# Patient Record
Sex: MALE | Race: BLACK OR AFRICAN AMERICAN | NOT HISPANIC OR LATINO | Employment: UNEMPLOYED | URBAN - METROPOLITAN AREA
[De-identification: names, ages, dates, MRNs, and addresses within clinical notes are randomized per-mention and may not be internally consistent; named-entity substitution may affect disease eponyms.]

---

## 2020-12-27 ENCOUNTER — HOSPITAL ENCOUNTER (EMERGENCY)
Facility: HOSPITAL | Age: 25
Discharge: HOME | End: 2020-12-27
Attending: EMERGENCY MEDICINE
Payer: MEDICAID

## 2020-12-27 VITALS
SYSTOLIC BLOOD PRESSURE: 129 MMHG | RESPIRATION RATE: 18 BRPM | OXYGEN SATURATION: 97 % | DIASTOLIC BLOOD PRESSURE: 83 MMHG | HEART RATE: 84 BPM | WEIGHT: 131.31 LBS | TEMPERATURE: 98 F

## 2020-12-27 DIAGNOSIS — K02.9 DENTAL CARIES: Primary | ICD-10-CM

## 2020-12-27 DIAGNOSIS — R22.0 FACIAL SWELLING: ICD-10-CM

## 2020-12-27 PROCEDURE — 99284 EMERGENCY DEPT VISIT MOD MDM: CPT

## 2020-12-27 RX ORDER — PENICILLIN V POTASSIUM 500 MG/1
500 TABLET, FILM COATED ORAL 4 TIMES DAILY
Qty: 40 TABLET | Refills: 0 | Status: SHIPPED | OUTPATIENT
Start: 2020-12-27 | End: 2021-01-06

## 2020-12-27 RX ORDER — TRAMADOL HYDROCHLORIDE 50 MG/1
50 TABLET ORAL EVERY 6 HOURS PRN
Qty: 12 TABLET | Refills: 0 | Status: SHIPPED | OUTPATIENT
Start: 2020-12-27

## 2020-12-27 NOTE — Clinical Note
"Ted Guzmanleah Espinosa was seen and treated in our emergency department on 12/27/2020.  He may return to work on 12/30/2020.       If you have any questions or concerns, please don't hesitate to call.      JAUN Jules"

## 2020-12-27 NOTE — ED PROVIDER NOTES
Encounter Date: 12/27/2020       History     Chief Complaint   Patient presents with    Facial Pain     pt c/o area of swelling and pain to R cheek, x2 days     The history is provided by the patient.   Dental Pain  The primary symptoms include mouth pain. Primary symptoms do not include fever, shortness of breath, sore throat or cough. The symptoms began two days ago. The symptoms are unchanged. The symptoms are new.   Affected locations include: gum(s).   Additional symptoms include: dental sensitivity to temperature, gum swelling, gum tenderness and facial swelling. Additional symptoms do not include: purulent gums, trismus and jaw pain.     Review of patient's allergies indicates:  No Known Allergies  No past medical history on file.  No past surgical history on file.  No family history on file.  Social History     Tobacco Use    Smoking status: Not on file   Substance Use Topics    Alcohol use: Not on file    Drug use: Not on file     Review of Systems   Constitutional: Negative for diaphoresis and fever.   HENT: Positive for dental problem and facial swelling. Negative for sore throat.    Eyes: Negative for photophobia and redness.   Respiratory: Negative for cough and shortness of breath.    Cardiovascular: Negative for chest pain.   Gastrointestinal: Negative for abdominal pain, constipation, diarrhea, nausea and vomiting.   Endocrine: Negative for polydipsia and polyphagia.   Genitourinary: Negative for dysuria and frequency.   Musculoskeletal: Negative for back pain.   Skin: Negative for rash.   Neurological: Negative for weakness.   Hematological: Does not bruise/bleed easily.   Psychiatric/Behavioral: The patient is not nervous/anxious.    All other systems reviewed and are negative.      Physical Exam     Initial Vitals [12/27/20 0849]   BP Pulse Resp Temp SpO2   129/83 84 18 98.1 °F (36.7 °C) 97 %      MAP       --         Physical Exam    Nursing note and vitals reviewed.  Constitutional: He  appears well-developed and well-nourished.   HENT:   Head: Normocephalic and atraumatic.   Right Ear: External ear normal.   Left Ear: External ear normal.   Nose: Nose normal.   Mouth/Throat: Oropharynx is clear and moist. No oral lesions. No trismus in the jaw. Dental caries present. No dental abscesses, uvula swelling or lacerations. No oropharyngeal exudate.   Eyes: Conjunctivae and EOM are normal. Pupils are equal, round, and reactive to light.   Neck: Normal range of motion. Neck supple.   Cardiovascular: Normal rate, regular rhythm, normal heart sounds and intact distal pulses.   Pulmonary/Chest: Breath sounds normal. No respiratory distress. He has no wheezes. He has no rhonchi. He has no rales.   Abdominal: Soft. Bowel sounds are normal. He exhibits no distension. There is no abdominal tenderness. There is no rebound and no guarding.   Musculoskeletal: Normal range of motion.   Neurological: He is alert and oriented to person, place, and time. He has normal strength.   Skin: Skin is warm and dry.   Psychiatric: He has a normal mood and affect. His behavior is normal. Judgment and thought content normal.         ED Course   Procedures  Labs Reviewed - No data to display       Imaging Results    None                                      Clinical Impression:       ICD-10-CM ICD-9-CM   1. Dental caries  K02.9 521.00   2. Facial swelling  R22.0 784.2                      Disposition:   Disposition: Discharged  Condition: Stable     ED Disposition Condition    Discharge Stable        ED Prescriptions     Medication Sig Dispense Start Date End Date Auth. Provider    penicillin v potassium (VEETID) 500 MG tablet Take 1 tablet (500 mg total) by mouth 4 (four) times daily. for 10 days 40 tablet 12/27/2020 1/6/2021 JAUN Jules    traMADoL (ULTRAM) 50 mg tablet Take 1 tablet (50 mg total) by mouth every 6 (six) hours as needed. 12 tablet 12/27/2020  JAUN Jules        Follow-up Information      Follow up With Specialties Details Why Contact Info    Dentist  Go in 1 day                                         JAUN Jules  12/27/20 6095

## 2021-07-19 ENCOUNTER — HOSPITAL ENCOUNTER (EMERGENCY)
Facility: HOSPITAL | Age: 26
Discharge: HOME | End: 2021-07-19
Attending: EMERGENCY MEDICINE
Payer: MEDICAID

## 2021-07-19 VITALS
SYSTOLIC BLOOD PRESSURE: 112 MMHG | DIASTOLIC BLOOD PRESSURE: 58 MMHG | TEMPERATURE: 98 F | OXYGEN SATURATION: 98 % | WEIGHT: 140 LBS | HEART RATE: 64 BPM | RESPIRATION RATE: 18 BRPM | BODY MASS INDEX: 21.22 KG/M2 | HEIGHT: 68 IN

## 2021-07-19 DIAGNOSIS — U07.1 CLINICAL DIAGNOSIS OF COVID-19: Primary | ICD-10-CM

## 2021-07-19 LAB
CTP QC/QA: YES
SARS-COV-2 RDRP RESP QL NAA+PROBE: NEGATIVE

## 2021-07-19 PROCEDURE — U0002 COVID-19 LAB TEST NON-CDC: HCPCS | Mod: ER | Performed by: NURSE PRACTITIONER

## 2021-07-19 PROCEDURE — 99282 EMERGENCY DEPT VISIT SF MDM: CPT | Mod: 25,ER

## 2023-08-29 ENCOUNTER — HOSPITAL ENCOUNTER (EMERGENCY)
Facility: HOSPITAL | Age: 28
Discharge: HOME | End: 2023-08-29
Attending: EMERGENCY MEDICINE
Payer: MEDICAID

## 2023-08-29 VITALS
TEMPERATURE: 99 F | BODY MASS INDEX: 22.05 KG/M2 | RESPIRATION RATE: 16 BRPM | DIASTOLIC BLOOD PRESSURE: 62 MMHG | OXYGEN SATURATION: 100 % | HEART RATE: 84 BPM | WEIGHT: 145 LBS | SYSTOLIC BLOOD PRESSURE: 107 MMHG

## 2023-08-29 DIAGNOSIS — M62.830 SPASM OF LUMBAR PARASPINOUS MUSCLE: Primary | ICD-10-CM

## 2023-08-29 PROCEDURE — 63600175 PHARM REV CODE 636 W HCPCS: Mod: ER

## 2023-08-29 PROCEDURE — 99284 EMERGENCY DEPT VISIT MOD MDM: CPT | Mod: ER

## 2023-08-29 PROCEDURE — 25000003 PHARM REV CODE 250: Mod: ER

## 2023-08-29 PROCEDURE — 96372 THER/PROPH/DIAG INJ SC/IM: CPT

## 2023-08-29 RX ORDER — ORPHENADRINE CITRATE 100 MG/1
100 TABLET, EXTENDED RELEASE ORAL
Status: COMPLETED | OUTPATIENT
Start: 2023-08-29 | End: 2023-08-29

## 2023-08-29 RX ORDER — KETOROLAC TROMETHAMINE 30 MG/ML
30 INJECTION, SOLUTION INTRAMUSCULAR; INTRAVENOUS
Status: COMPLETED | OUTPATIENT
Start: 2023-08-29 | End: 2023-08-29

## 2023-08-29 RX ORDER — NAPROXEN 500 MG/1
500 TABLET ORAL 2 TIMES DAILY WITH MEALS
Qty: 10 TABLET | Refills: 0 | Status: SHIPPED | OUTPATIENT
Start: 2023-08-29 | End: 2023-09-03

## 2023-08-29 RX ORDER — ACETAMINOPHEN 500 MG
500 TABLET ORAL EVERY 6 HOURS PRN
Qty: 28 TABLET | Refills: 0 | Status: SHIPPED | OUTPATIENT
Start: 2023-08-29 | End: 2023-09-05

## 2023-08-29 RX ORDER — TIZANIDINE 2 MG/1
2 TABLET ORAL EVERY 8 HOURS PRN
Qty: 15 TABLET | Refills: 0 | Status: SHIPPED | OUTPATIENT
Start: 2023-08-29

## 2023-08-29 RX ADMIN — ORPHENADRINE CITRATE 100 MG: 100 TABLET, EXTENDED RELEASE ORAL at 09:08

## 2023-08-29 RX ADMIN — KETOROLAC TROMETHAMINE 30 MG: 30 INJECTION, SOLUTION INTRAMUSCULAR; INTRAVENOUS at 09:08

## 2023-08-29 NOTE — Clinical Note
"Ted"Bear Espinosa was seen and treated in our emergency department on 8/29/2023.  He may return to work on 08/31/2023.       If you have any questions or concerns, please don't hesitate to call.      Farzad Ellis, JAYDON"

## 2023-08-30 NOTE — ED TRIAGE NOTES
Ted Espinosa Jr., a 27 y.o. male presents to the ED w/ complaint of lower back pain that started Sunday morning.  He denies injury and has no idea what he did to his back.  He has no loss of sensation or motor activity.     Triage note:  Chief Complaint   Patient presents with    Back Pain     Pt started with lower back pain on Sunday   Pt has been trying to get through pain but pain has progressively become worse   Denies injury      Review of patient's allergies indicates:  No Known Allergies  History reviewed. No pertinent past medical history.

## 2023-08-30 NOTE — DISCHARGE INSTRUCTIONS

## 2023-08-30 NOTE — ED PROVIDER NOTES
Encounter Date: 8/29/2023    SCRIBE #1 NOTE: I, Mara Villatoro, am scribing for, and in the presence of,  Farzad Ellis PA-C. I have scribed the following portions of the note - Other sections scribed: HPI,ROS.       History     Chief Complaint   Patient presents with    Back Pain     Pt started with lower back pain on Sunday   Pt has been trying to get through pain but pain has progressively become worse   Denies injury      Ted Espinosa Jr. is a 27 y.o. male, with no pertinent PMHx, who presents to the ED with Lower back pain onset 3 days ago. Patient reports waking up with lower right sided back pain that worsens when he turns to the left. Patient reports that the pain has been worsening over time which is what brought him into the ED today. Patient denies any recent heavy lifting or injuries occurring to the area. Patient states that he is able to ambulate as normal.  No other exacerbating or alleviating factors. Denies hematuria, frequency, urgency, dysuria, numbness, tingling, fever, bladder/bowel incontinence or other associated symptoms.      The history is provided by the patient. No  was used.     Review of patient's allergies indicates:  No Known Allergies  History reviewed. No pertinent past medical history.  History reviewed. No pertinent surgical history.  History reviewed. No pertinent family history.     Review of Systems   Constitutional:  Negative for diaphoresis, fatigue and unexpected weight change.   HENT:  Negative for sinus pain and sore throat.    Eyes:  Negative for pain, redness and visual disturbance.   Respiratory:  Negative for cough, chest tightness, shortness of breath and wheezing.    Cardiovascular:  Negative for chest pain and palpitations.   Gastrointestinal:  Negative for abdominal pain, blood in stool, diarrhea, nausea and vomiting.   Endocrine: Negative for polydipsia, polyphagia and polyuria.   Genitourinary:  Negative for dysuria, frequency and  urgency.   Musculoskeletal:  Positive for back pain. Negative for arthralgias and myalgias.   Skin:  Negative for rash.   Allergic/Immunologic: Negative for environmental allergies.   Neurological:  Negative for dizziness, syncope and headaches.   Psychiatric/Behavioral:  Negative for suicidal ideas.        Physical Exam     Initial Vitals [08/29/23 2005]   BP Pulse Resp Temp SpO2   105/67 88 20 99 °F (37.2 °C) 100 %      MAP       --         Physical Exam    Nursing note and vitals reviewed.  Constitutional: He appears well-developed and well-nourished. He is not diaphoretic. No distress.   HENT:   Head: Normocephalic and atraumatic.   Mouth/Throat: Oropharynx is clear and moist. No oropharyngeal exudate.   Eyes: EOM are normal. Pupils are equal, round, and reactive to light. No scleral icterus.   Neck: Neck supple.   Normal range of motion.  Cardiovascular:  Normal rate, regular rhythm and intact distal pulses.           No murmur heard.  Pulmonary/Chest: Breath sounds normal. No stridor. No respiratory distress. He has no wheezes. He has no rhonchi. He exhibits no tenderness.   Abdominal: Abdomen is soft. Bowel sounds are normal. There is no abdominal tenderness.   Musculoskeletal:         General: No edema.      Cervical back: Normal range of motion and neck supple.      Thoracic back: Spasms and tenderness present. No bony tenderness. Decreased range of motion.      Lumbar back: Spasms and tenderness present. No bony tenderness. Decreased range of motion.      Comments: Spine palpated from the occiput to the top of the sacrum.  No midline bony tenderness.  No bony step-offs.  Spasms and tenderness from the midthoracic back to the lumbar back.  Decreased range of motion, especially on rotation.  Spasm and tenderness slightly worse on the right than on the left.     Neurological: He is alert and oriented to person, place, and time. He has normal strength.   Skin: Skin is warm. No pallor.   Psychiatric: He has a  normal mood and affect.         ED Course   Procedures  Labs Reviewed - No data to display       Imaging Results    None          Medications   ketorolac injection 30 mg (30 mg Intramuscular Given 8/29/23 2151)   orphenadrine 12 hr tablet 100 mg (100 mg Oral Given 8/29/23 2153)     Medical Decision Making  27-year-old male presenting to the ED with a chief complaint of back pain.  Atraumatic, no inciting incident such as MVC or fall.  Pain reproduced with certain movements.  No fever, no neurological signs, no IV drug use, no other red flags.  On physical exam, patient was clinically well-appearing and in no acute distress.  Vitals are within normal limits.  There is tenderness to palpation and muscle spasm in the paraspinal musculature of the mid and low back, worse on the right than the left.    Differential diagnosis includes but is not limited to lumbago with or without sciatica, low back strain, contusion, fracture, compression fracture, dislocation, cauda equina syndrome, spinal epidural abscess, spinal epidural hematoma.     With no trauma, I do not see the use in x-rays today.  No imaging studies were obtained.  Presentation is consistent with back spasms of the paraspinal muscles of the lumbar back.  Acute management in the emergency department with Toradol and Norflex.  Patient had a ride home.  Naproxen, Tylenol, and tizanidine electronically prescribed and sent to the patient's preferred pharmacy for analgesia at home.  I also printed out back stretches and provided them to the patient.  Urged as much stretching as tolerated.    Return precautions were discussed, all patient questions were answered, and the patient was agreeable to the plan of care.  He was discharged home in stable condition and will follow up with his primary care provider or return to the emergency department if his symptoms worsen or do not improve.     Risk  OTC drugs.  Prescription drug management.            Scribe Attestation:    Scribe #1: I performed the above scribed service and the documentation accurately describes the services I performed. I attest to the accuracy of the note.              I, Farzad Ellis PA-C, personally performed the services described in this documentation.  All medical record entries made by the scribe were at my direction and in my presence.  I have reviewed the chart and agree that the record reflects my personal performance and is accurate and complete.            Clinical Impression:   Final diagnoses:  [M62.830] Spasm of lumbar paraspinous muscle (Primary)        ED Disposition Condition    Discharge Stable          ED Prescriptions       Medication Sig Dispense Start Date End Date Auth. Provider    tiZANidine (ZANAFLEX) 2 MG tablet Take 1 tablet (2 mg total) by mouth every 8 (eight) hours as needed (Muscle spasm). 15 tablet 8/29/2023 -- Farzad Ellis PA-C    acetaminophen (TYLENOL) 500 MG tablet Take 1 tablet (500 mg total) by mouth every 6 (six) hours as needed. 28 tablet 8/29/2023 9/5/2023 Farzad Ellis PA-C    naproxen (NAPROSYN) 500 MG tablet Take 1 tablet (500 mg total) by mouth 2 (two) times daily with meals. for 5 days 10 tablet 8/29/2023 9/3/2023 Farzad Ellis PA-C          Follow-up Information       Follow up With Specialties Details Why Contact [facility] -  Schedule an appointment as soon as possible for a visit  As needed, If symptoms worsen [address] [place] [address]  [phone]      [facility] ED Emergency Medicine Go to  If symptoms worsen [address] [address] [phone]             Farzad Ellis PA-C  08/29/23 3972